# Patient Record
Sex: MALE | Employment: FULL TIME | URBAN - METROPOLITAN AREA
[De-identification: names, ages, dates, MRNs, and addresses within clinical notes are randomized per-mention and may not be internally consistent; named-entity substitution may affect disease eponyms.]

---

## 2021-05-20 ENCOUNTER — HOSPITAL ENCOUNTER (OUTPATIENT)
Dept: NON INVASIVE DIAGNOSTICS | Age: 56
Discharge: HOME | End: 2021-05-20
Payer: COMMERCIAL

## 2021-05-20 DIAGNOSIS — R03.0 ELEVATED BLOOD PRESSURE READING WITHOUT DIAGNOSIS OF HYPERTENSION: ICD-10-CM

## 2021-05-20 DIAGNOSIS — I45.10 RBBB: ICD-10-CM

## 2021-05-20 DIAGNOSIS — I45.10 RIGHT BUNDLE BRANCH BLOCK: ICD-10-CM

## 2021-05-20 PROBLEM — D17.24 LIPOMA OF LEFT LOWER EXTREMITY: Status: ACTIVE | Noted: 2021-05-20

## 2021-05-20 PROBLEM — D17.20 LIPOMA OF HAND: Status: ACTIVE | Noted: 2021-05-20

## 2021-05-20 PROBLEM — D17.21 LIPOMA OF RIGHT UPPER EXTREMITY: Status: ACTIVE | Noted: 2021-05-20

## 2021-05-20 PROBLEM — D17.22 LIPOMA OF LEFT UPPER EXTREMITY: Status: ACTIVE | Noted: 2021-05-20

## 2021-05-20 LAB
LV EF: 58 %
LVEF MODALITY: NORMAL

## 2021-05-20 PROCEDURE — 93306 TTE W/DOPPLER COMPLETE: CPT

## 2021-09-14 ENCOUNTER — INITIAL CONSULT (OUTPATIENT)
Dept: CARDIOLOGY CLINIC | Age: 56
End: 2021-09-14
Payer: COMMERCIAL

## 2021-09-14 VITALS
HEART RATE: 61 BPM | BODY MASS INDEX: 30.33 KG/M2 | SYSTOLIC BLOOD PRESSURE: 142 MMHG | HEIGHT: 69 IN | WEIGHT: 204.8 LBS | DIASTOLIC BLOOD PRESSURE: 88 MMHG

## 2021-09-14 DIAGNOSIS — R07.9 CHEST PAIN, UNSPECIFIED TYPE: Primary | ICD-10-CM

## 2021-09-14 PROCEDURE — 99204 OFFICE O/P NEW MOD 45 MIN: CPT | Performed by: INTERNAL MEDICINE

## 2021-09-14 PROCEDURE — 93000 ELECTROCARDIOGRAM COMPLETE: CPT | Performed by: INTERNAL MEDICINE

## 2021-09-14 NOTE — PATIENT INSTRUCTIONS
Please be informed that if you contact our office outside of normal business hours the physician on call cannot help with any scheduling or rescheduling issues, procedure instruction questions or any type of medication issue. We advise you for any urgent/emergency that you go to the nearest emergency room! PLEASE CALL OUR OFFICE DURING NORMAL BUSINESS HOURS    Monday - Friday   8 am to 5 pm    [place]: [place]: [phone]    [place]:  [phone]    **It is YOUR responsibilty to bring medication bottles and/or updated medication list to [address] [place] [address].  This will allow us to better serve you and all your healthcare needs**

## 2021-09-14 NOTE — LETTER
these providers! Do you have a surgery or procedure scheduled in the near future -  NONE   If Yes- DO EKG  If Yes - Who is the surgery with?    Phone number of physician   Fax number of physician   Type of surgery   GIVE THIS INFORMATION TO KAYLAN SHIELDS     Ask patient if they want to sign up for MyChart if they are not already signed up     Check to see if we have an E-MAIL on file for the patient     Check medication list thoroughly!!! AND RECONCILE OUTSIDE MEDICATIONS  If dose has changed change the entire order not just the [place] At check out add to every patient's \"wrap up\" the following dot phrase AFTERHOURSEDUCATION and ensure we explain this to our patients

## 2021-09-14 NOTE — PROGRESS NOTES
Minnette Saint  is a  New patient  ,55 y.o.   male here for evaluation of the following chief complaint(s):    Abn ekg        SUBJECTIVE/OBJECTIVE:  HPI : h/o  rbb now here  Has been having SOB, has no CP for  [facility]    Review of Systems    SOB    Vitals:    09/14/21 1520   BP: (!) 142/88   Pulse: 61   Weight: 204 lb 12.8 oz (92.9 kg)   Height: 5' 9\" (1.753 m)     BP (!) 142/88   Pulse 61   Ht 5' 9\" (1.753 m)   Wt 204 lb 12.8 oz (92.9 kg)   BMI 30.24 kg/m²   No flowsheet data found. Wt Readings from Last 3 Encounters:   09/14/21 204 lb 12.8 oz (92.9 kg)   05/26/21 200 lb (90.7 kg)   05/04/21 200 lb (90.7 kg)     Body mass index is 30.24 kg/m². Physical Exam     Neck: JVD  no    Lungs : clear    Cardio : Si and S2 audilble      Ext: edema  no    All pertinent data reviewed  y    Meds : reviewed   y      Tests ordered    y    ASSESSMENT/PLAN:    - RBBB:  ETT    - RV dilated, echo reviewed  CT chest      Conclusions      Summary   Left ventricular systolic function is normal.   Ejection fraction is visually estimated at 55-60%. Severely dilated right ventricle measuring 4.28 cm. No evidence of right   heart strain. No significant valvular disease noted. No evidence of any pericardial effusion. Signature      ------------------------------------------------------------------   Electronically signed by Андрей Nunez MD (Interpreting   physician) on 05/20/2021 at 12:39 PM    An electronic signature was used to authenticate this note.     --Kan Vogel MD

## 2021-09-15 ENCOUNTER — TELEPHONE (OUTPATIENT)
Dept: CARDIOLOGY CLINIC | Age: 56
End: 2021-09-15

## 2021-09-20 ENCOUNTER — HOSPITAL ENCOUNTER (OUTPATIENT)
Dept: CT IMAGING | Age: 56
Discharge: HOME | End: 2021-09-20
Payer: COMMERCIAL

## 2021-09-20 DIAGNOSIS — R07.9 CHEST PAIN, UNSPECIFIED TYPE: ICD-10-CM

## 2021-09-20 PROCEDURE — 6360000004 HC RX CONTRAST MEDICATION: Performed by: INTERNAL MEDICINE

## 2021-09-20 PROCEDURE — 2580000003 HC RX 258: Performed by: INTERNAL MEDICINE

## 2021-09-20 PROCEDURE — 71275 CT ANGIOGRAPHY CHEST: CPT

## 2021-09-20 RX ORDER — SODIUM CHLORIDE 0.9 % (FLUSH) 0.9 %
10 SYRINGE (ML) INJECTION PRN
Status: DISCONTINUED | OUTPATIENT
Start: 2021-09-20 | End: 2021-09-21 | Stop reason: HOSPADM

## 2021-09-20 RX ADMIN — SODIUM CHLORIDE, PRESERVATIVE FREE 10 ML: 5 INJECTION INTRAVENOUS at 13:14

## 2021-09-20 RX ADMIN — IOPAMIDOL 75 ML: 755 INJECTION, SOLUTION INTRAVENOUS at 13:14

## 2021-09-22 ENCOUNTER — PROCEDURE VISIT (OUTPATIENT)
Dept: CARDIOLOGY CLINIC | Age: 56
End: 2021-09-22

## 2021-09-22 DIAGNOSIS — R07.9 CHEST PAIN, UNSPECIFIED TYPE: ICD-10-CM

## 2021-10-13 PROBLEM — I45.10 RIGHT BUNDLE BRANCH BLOCK: Status: ACTIVE | Noted: 2021-10-13

## 2021-10-14 ENCOUNTER — OFFICE VISIT (OUTPATIENT)
Dept: CARDIOLOGY CLINIC | Age: 56
End: 2021-10-14
Payer: COMMERCIAL

## 2021-10-14 ENCOUNTER — PROCEDURE VISIT (OUTPATIENT)
Dept: CARDIOLOGY CLINIC | Age: 56
End: 2021-10-14
Payer: COMMERCIAL

## 2021-10-14 VITALS
SYSTOLIC BLOOD PRESSURE: 128 MMHG | HEART RATE: 64 BPM | WEIGHT: 203 LBS | DIASTOLIC BLOOD PRESSURE: 88 MMHG | BODY MASS INDEX: 30.07 KG/M2 | HEIGHT: 69 IN

## 2021-10-14 DIAGNOSIS — I45.10 RIGHT BUNDLE BRANCH BLOCK: ICD-10-CM

## 2021-10-14 DIAGNOSIS — I51.7 ENLARGED RV (RIGHT VENTRICLE): Primary | ICD-10-CM

## 2021-10-14 PROCEDURE — 93308 TTE F-UP OR LMTD: CPT | Performed by: INTERNAL MEDICINE

## 2021-10-14 PROCEDURE — 99213 OFFICE O/P EST LOW 20 MIN: CPT | Performed by: NURSE PRACTITIONER

## 2021-10-14 ASSESSMENT — ENCOUNTER SYMPTOMS
ORTHOPNEA: 0
SHORTNESS OF BREATH: 0

## 2021-10-14 NOTE — PATIENT INSTRUCTIONS
**It is YOUR responsibilty to bring medication bottles and/or updated medication list to [address] [place] [address]. This will allow us to better serve you and all your healthcare needs**  Please be informed that if you contact our office outside of normal business hours the physician on call cannot help with any scheduling or rescheduling issues, procedure instruction questions or any type of medication issue. We advise you for any urgent/emergency that you go to the nearest emergency room!     PLEASE CALL OUR OFFICE DURING NORMAL BUSINESS HOURS    Monday - Friday   8 am to 5 pm    [place]: [phone]    [place]:  [phone]

## 2021-10-14 NOTE — LETTER
Rayann Papas Dr. Jettie Bigness D Isadore Homans  1965  [id number]    Have you had any Chest Pain that is not new? - No       DO EKG IF: Patient has a Heart Rate above 100 or below 40     CAD (Coronary Artery Disease) patient should have one on file every 6 months      Have you had any Shortness of Breath - No      Have you had any dizziness - No       Sitting wait 5 minutes do supine (laying down) wait 5 minutes then do standing - log each in \"vitals\" area in Epic   Be sure to ask what symptoms they are having if they get dizzy while completing ortho stats such as: room spinning, nausea, etc.    Have you had any palpitations that are not new? - No    Is the patient on any of the following medications - No  If Yes DO EKG - Needs done every 6 months    Do you have any edema - swelling in No        Vein \"LEG PROBLEM Questionnaire\"  1. Do you have prominent leg veins? No   2. Do you have any skin discoloration? No  3. Do you have any healed or active sores? No  4. Do you have swelling of the legs? No  5. Do you have a family history of varicose veins? No  6. Does your profession involve pro-longed        standing or heavy lifting? Yes  7. Have you been fighting overweight problems? No  8. Do you have restless legs? No  9. Do you have any night time cramps? No  10. Do you have any of the following in your legs: No             When did you have your last labs drawn 05/24/2021  Where did you have them done   What doctor ordered Katy Iglesias    If we do not have these labs you are retrieve these labs for these providers!     Do you have a surgery or procedure scheduled in the near future - No       Ask patient if they want to sign up for [employer] if they are not already signed up     Check to see if we have an E-MAIL on file for the patient     Check medication list thoroughly!!! AND RECONCILE OUTSIDE MEDICATIONS  If dose has changed change the entire order not just the MG  BE SURE TO ASK PATIENT IF THEY NEED MEDICATION REFILLS     At check out add to every patient's \"wrap up\" the following dot phrase AFTERHOURSEDUCATION and ensure we explain this to our patients

## 2021-10-14 NOTE — PROGRESS NOTES
10/14/2021  Primary cardiologist: Dr. Mixon Tommie  is an established 55 y.o.  male here for follow-up on   1. Enlarged RV (right ventricle)    2. Right bundle branch block            SUBJECTIVE/OBJECTIVE:    HPI : Emil Diaz is a 50-year-old gentleman with a history of right bundle branch block and abnormal EKG. He was initially seen as a consult in September 2021 for abnormal EKG and chest pain. He underwent noninvasive testing with exercise treadmill in which she exercised according to Christiano protocol for 11 minutes achieving a work level of 13.4 METS. EKG showed no evidence of ischemia. Echocardiogram demonstrated normal left ventricular systolic function with ejection fraction estimated 35 to 60%. He is noted to have a severely dilated RV measuring 4.12 cm with no evidence of right heart strain. There is no significant valvular heart disease appreciated. CTA of chest showed no evidence of pulmonary emboli or acute pulmonary abnormality. [place] reports feeling well. Denies chest pain or shortness of breath. Denies palpitations, lightheadedness or dizziness. Noted couple years ago he was working heavily he would get lightheaded and nauseous at the end of the exercise routine however is backed off on exercise as heart symptoms have resolved. Notes he drinks alcohol on a routine basis. Review of Systems   Constitutional: Negative for diaphoresis and malaise/fatigue. Cardiovascular: Negative for chest pain, claudication, dyspnea on exertion, irregular heartbeat, leg swelling, near-syncope, orthopnea, palpitations and paroxysmal nocturnal dyspnea. Respiratory: Negative for shortness of breath. Neurological: Negative for dizziness and light-headedness. Vitals:    10/14/21 0824   BP: 128/88   Pulse: 64   Weight: 203 lb (92.1 kg)   Height: 5' 9\" (1.753 m)     No flowsheet data found.   Wt Readings from Last 3 Encounters:   10/14/21 203 lb (92.1 kg)   09/14/21 204 lb 12.8 oz (92.9 kg) on medications       Tests ordered:  eliminated echo to reassess  RV size      Follow-up  6 months     Signed:  ANNALEE Murphy CNP, 10/14/2021, 8:29 AM    An electronic signature was used to authenticate this note.

## 2021-10-19 ENCOUNTER — TELEPHONE (OUTPATIENT)
Dept: CARDIOLOGY CLINIC | Age: 56
End: 2021-10-19

## 2021-10-19 NOTE — TELEPHONE ENCOUNTER
Spoke with pt about results.  He didn't have any questions.        ----- Message from Maureen Roberts sent at 10/18/2021 10:28 AM EDT -----    ----- Message -----  From: ANNALEE Schofield - [place]  Sent: 10/15/2021   4:04 PM EDT  To: Hari Guzman MA    Please phone results - RV is dilated - about the same - continue to follow as scheduled

## 2022-02-15 ENCOUNTER — TELEPHONE (OUTPATIENT)
Dept: CARDIOLOGY CLINIC | Age: 57
End: 2022-02-15

## 2022-02-15 NOTE — TELEPHONE ENCOUNTER
Wife called asking for a copy  of last ekg  From 9/21 needs to turn into there Insurance  Please call when ready

## 2022-02-16 NOTE — TELEPHONE ENCOUNTER
Called and spoke with Jadon ness about med. rec release. [employer] form allows us to discuss pts health, testing, appts and billing with those listed. Not for signing paperwork for the patient. POA would allow those listed to sign for med.rec. I will be emailing the [facility]. release to pt for him to sign and the wife can bring it in to get the EKG only. pt ok with this.

## 2022-04-19 ENCOUNTER — OFFICE VISIT (OUTPATIENT)
Dept: CARDIOLOGY CLINIC | Age: 57
End: 2022-04-19
Payer: COMMERCIAL

## 2022-04-19 VITALS
DIASTOLIC BLOOD PRESSURE: 80 MMHG | SYSTOLIC BLOOD PRESSURE: 128 MMHG | WEIGHT: 198 LBS | BODY MASS INDEX: 29.33 KG/M2 | HEART RATE: 52 BPM | HEIGHT: 69 IN

## 2022-04-19 DIAGNOSIS — I51.7 RIGHT VENTRICULAR DILATION: Primary | ICD-10-CM

## 2022-04-19 PROCEDURE — 99214 OFFICE O/P EST MOD 30 MIN: CPT | Performed by: INTERNAL MEDICINE

## 2022-04-19 NOTE — PROGRESS NOTES
CARDIOLOGY NOTE      4/19/2022    RE: Liz Banegas  (1965)                               TO:  Dr. Robert Harden MD            CHIEF Martínez Ellis is a 65 y.o. male who was seen today for management of right bundle branch block                                    HPI:                   Pt has h/o right bundle branch block, dilated right ventricle, seen today for follow-up. Pt has no cardiac complaints    Liz Banegas has the following history recorded in care path:  Patient Active Problem List    Diagnosis Date Noted    Enlarged RV (right ventricle) 10/14/2021    Right bundle branch block 10/13/2021    Lipoma of left upper extremity 05/20/2021    Lipoma of left lower extremity 05/20/2021    Lipoma of right upper extremity 05/20/2021    Lipoma of hand 05/20/2021    Overweight (BMI 25.0-29.9) 09/17/2015    Alcohol abuse 09/17/2015    Allergic rhinitis due to pollen 09/17/2015    Testicular pain, left 09/17/2015     No current outpatient medications on file. No current facility-administered medications for this visit. Allergies: Patient has no known allergies. History reviewed. No pertinent past medical history. Past Surgical History:   Procedure Laterality Date    LIPOMA RESECTION      VASECTOMY        As reviewed   Family History   Problem Relation Age of Onset    Diabetes Mother     High Blood Pressure Mother     Cancer Paternal Grandfather      Social History     Tobacco Use    Smoking status: Never Smoker    Smokeless tobacco: Never Used   Substance Use Topics    Alcohol use: Yes     Alcohol/week: 35.0 standard drinks     Types: 35 Shots of liquor per week        Objective:    Vitals:    04/19/22 1002   BP: 128/80   Pulse: 52   Weight: 198 lb (89.8 kg)   Height: 5' 9\" (1.753 m)     /80   Pulse 52   Ht 5' 9\" (1.753 m)   Wt 198 lb (89.8 kg)   BMI 29.24 kg/m²     No flowsheet data found.      Wt Readings from Last 3 Encounters:   04/19/22 198 lb (89.8 kg)   10/14/21 203 lb (92.1 kg)   09/14/21 204 lb 12.8 oz (92.9 kg)     Body mass index is 29.24 kg/m². GENERAL - Alert, oriented, pleasant, in no apparent distress. EYES: No jaundice, no conjunctival pallor. SKIN: It is warm & dry. No rashes. No Echhymosis    HEENT  No clinically significant abnormalities seen. Neck - Supple. No jugular venous distention noted. No carotid bruits. Cardiovascular  Normal S1 and S2 without obvious murmur or gallop. Extremities - No cyanosis, clubbing, or significant edema. Pulmonary  No respiratory distress. No wheezes or rales. Abdomen  No masses, tenderness, or organomegaly. Musculoskeletal  No significant edema. No joint deformities. No muscle wasting. Neurologic  Cranial nerves II through XII are grossly intact. There were no gross focal neurologic abnormalities.     Lab Review   No results found for: CKTOTAL, CKMB, CKMBINDEX, TROPONINT  BNP:  No results found for: BNP  PT/INR:  No results found for: INR  Lab Results   Component Value Date    LABA1C 4.8 09/17/2015     Lab Results   Component Value Date    WBC 4.3 09/17/2015    HCT 43.4 09/17/2015    MCV 90.8 09/17/2015     09/17/2015     Lab Results   Component Value Date    CHOL 161 09/17/2015    TRIG 101 09/17/2015    HDL 39 (L) 09/17/2015    LDLCALC 102 (H) 09/17/2015     Lab Results   Component Value Date    ALT 30 09/17/2015    AST 25 09/17/2015     BMP:    Lab Results   Component Value Date     09/17/2015    K 4.2 09/17/2015     09/17/2015    CO2 27 09/17/2015    BUN 15 09/17/2015    CREATININE 1.2 09/17/2015     CMP:   Lab Results   Component Value Date     09/17/2015    K 4.2 09/17/2015     09/17/2015    CO2 27 09/17/2015    BUN 15 09/17/2015    PROT 7.0 09/17/2015     TSH:  No results found for: TSH, TSHHS        Assessment & Plan:    -Right bundle branch block which is a benign finding and him    -Dilated right ventricle work-up has been negative there is no PE on the CT scan and no other abnormality found,no  family history might need a cardiac MRI if needed      -Advised to discontinue alcohol on last visit as well    -Had CAD work-up including stress test which was unremarkable    - echo rev with pt  Limited echo for RV  If dilated still get cardiac MRI to r/o ARVD    Summary   Limited echo to assess right side. Normal left ventricle structure and systolic function with an ejection   fraction of 55-60%. RV is difficult to visualize in apical views. Moderately dilated right ventricle. No evidence of strain or systolic   dysfunction. Normal pulmonary artery pressure, 29 mmHg. Signature      ------------------------------------------------------------------   Electronically signed by Darylene Brodie MD   (Interpreting physician) on 10/15/2021 at 03:57 PM  Feliciano Ch MD    [address] [facility]    Please note this report has been partially produced using speech recognition software and may contain errors related to that system including errors in grammar, punctuation, and spelling, as well as words and phrases that may be inappropriate. If there are any questions or concerns please feel free to contact the dictating provider for clarification.

## 2022-04-19 NOTE — PATIENT INSTRUCTIONS
**It is YOUR responsibilty to bring medication bottles and/or updated medication list to [address] [place] [address]. This will allow us to better serve you and all your healthcare needs**    Please be informed that if you contact our office outside of normal business hours the physician on call cannot help with any scheduling or rescheduling issues, procedure instruction questions or any type of medication issue. We advise you for any urgent/emergency that you go to the nearest emergency room!     PLEASE CALL OUR OFFICE DURING NORMAL BUSINESS HOURS    Monday - Friday   8 am to 5 pm    [place]: [place] 12: [phone]    [place]:  [phone]
Diabetes mellitus

## 2022-04-26 ENCOUNTER — PROCEDURE VISIT (OUTPATIENT)
Dept: CARDIOLOGY CLINIC | Age: 57
End: 2022-04-26
Payer: COMMERCIAL

## 2022-04-26 DIAGNOSIS — I45.10 RIGHT BUNDLE BRANCH BLOCK: ICD-10-CM

## 2022-04-26 DIAGNOSIS — I51.7 RIGHT VENTRICULAR DILATION: ICD-10-CM

## 2022-04-26 PROCEDURE — 93308 TTE F-UP OR LMTD: CPT | Performed by: INTERNAL MEDICINE

## 2022-04-27 ENCOUNTER — TELEPHONE (OUTPATIENT)
Dept: CARDIOLOGY CLINIC | Age: 57
End: 2022-04-27

## 2022-04-27 NOTE — TELEPHONE ENCOUNTER
Left ventricular function is normal, EF is estimated at 55-60%. Mildly enlarged right ventricle cavity. Mild tricuspid regurgitation RVSP 23mmhg. Dilation of the aortic root(4.1cm) and the ascending aorta(4.2cm). No evidence of any pericardial effusion. Results given to the patient, Patient verbally understood results.

## 2022-12-13 ENCOUNTER — OFFICE VISIT (OUTPATIENT)
Dept: CARDIOLOGY CLINIC | Age: 57
End: 2022-12-13
Payer: COMMERCIAL

## 2022-12-13 VITALS
WEIGHT: 201 LBS | DIASTOLIC BLOOD PRESSURE: 80 MMHG | BODY MASS INDEX: 29.77 KG/M2 | HEIGHT: 69 IN | HEART RATE: 64 BPM | SYSTOLIC BLOOD PRESSURE: 130 MMHG

## 2022-12-13 DIAGNOSIS — I51.7 RIGHT VENTRICULAR DILATION: Primary | ICD-10-CM

## 2022-12-13 PROCEDURE — 99214 OFFICE O/P EST MOD 30 MIN: CPT | Performed by: INTERNAL MEDICINE

## 2022-12-13 NOTE — PATIENT INSTRUCTIONS
Please be informed that if you contact our office outside of normal business hours the physician on call cannot help with any scheduling or rescheduling issues, procedure instruction questions or any type of medication issue. We advise you for any urgent/emergency that you go to the nearest emergency room! PLEASE CALL OUR OFFICE DURING NORMAL BUSINESS HOURS    Monday - Friday   8 am to 5 pm    [place]: [phone]    [place]:  [phone]  **It is YOUR responsibilty to bring medication bottles and/or updated medication list to [address] [place] [address]. This will allow us to better serve you and all your healthcare needs**  [facility] Locations - No appointment necessary. Sites open Monday to Friday. Call your preferred location for test preparation, business hours and other information you need. [employer] accepts [employer]. [facility]   [facility] [address] [place], [address] [facility]  Phone: [phone] [facility] [address] [facility]  [address] [place], [address]  Phone: [phone]     . ere

## 2022-12-13 NOTE — PROGRESS NOTES
CARDIOLOGY NOTE      12/13/2022    RE: Beronica Eaton  (1965)                               TO:  Dr. Edward Barnes MD            CHIEF Oanh Garcia is a 65 y.o. male who was seen today for management of right bundle branch block                                    HPI:                   Pt has h/o right bundle branch block, dilated right ventricle, seen today for follow-up. Pt has no cardiac complaints    Beronica Eaton has the following history recorded in care path:  Patient Active Problem List    Diagnosis Date Noted    Enlarged RV (right ventricle) 10/14/2021    Right bundle branch block 10/13/2021    Lipoma of left upper extremity 05/20/2021    Lipoma of left lower extremity 05/20/2021    Lipoma of right upper extremity 05/20/2021    Lipoma of hand 05/20/2021    Overweight (BMI 25.0-29.9) 09/17/2015    Alcohol abuse 09/17/2015    Allergic rhinitis due to pollen 09/17/2015    Testicular pain, left 09/17/2015     No current outpatient medications on file. No current facility-administered medications for this visit. Allergies: Patient has no known allergies. No past medical history on file. Past Surgical History:   Procedure Laterality Date    LIPOMA RESECTION      VASECTOMY        As reviewed   Family History   Problem Relation Age of Onset    Diabetes Mother     High Blood Pressure Mother     Cancer Paternal Grandfather      Social History     Tobacco Use    Smoking status: Never    Smokeless tobacco: Never   Substance Use Topics    Alcohol use: Yes     Alcohol/week: 12.0 standard drinks     Types: 12 Shots of liquor per week     Comment: caffeine 7 energy drinks per week.         Objective:    Vitals:    12/13/22 1533   BP: 130/80   Site: Left Upper Arm   Position: Sitting   Cuff Size: Medium Adult   Pulse: 64   Weight: 201 lb (91.2 kg)   Height: 5' 9\" (1.753 m)     /80 (Site: Left Upper Arm, Position: Sitting, Cuff Size: Medium Adult)   Pulse 64   Ht 5' 9\" (1.753 m) Wt 201 lb (91.2 kg)   BMI 29.68 kg/m²     No flowsheet data found. Wt Readings from Last 3 Encounters:   12/13/22 201 lb (91.2 kg)   04/19/22 198 lb (89.8 kg)   10/14/21 203 lb (92.1 kg)     Body mass index is 29.68 kg/m². GENERAL - Alert, oriented, pleasant, in no apparent distress. EYES: No jaundice, no conjunctival pallor. SKIN: It is warm & dry. No rashes. No Echhymosis    HEENT - No clinically significant abnormalities seen. Neck - Supple. No jugular venous distention noted. No carotid bruits. Cardiovascular - Normal S1 and S2 without obvious murmur or gallop. Extremities - No cyanosis, clubbing, or significant edema. Pulmonary - No respiratory distress. No wheezes or rales. Abdomen - No masses, tenderness, or organomegaly. Musculoskeletal - No significant edema. No joint deformities. No muscle wasting. Neurologic - Cranial nerves II through XII are grossly intact. There were no gross focal neurologic abnormalities.     Lab Review   No results found for: CKTOTAL, CKMB, CKMBINDEX, TROPONINT  BNP:  No results found for: BNP  PT/INR:  No results found for: INR  Lab Results   Component Value Date    LABA1C 4.8 09/17/2015     Lab Results   Component Value Date    WBC 4.3 09/17/2015    HCT 43.4 09/17/2015    MCV 90.8 09/17/2015     09/17/2015     Lab Results   Component Value Date    CHOL 161 09/17/2015    TRIG 101 09/17/2015    HDL 39 (L) 09/17/2015    LDLCALC 102 (H) 09/17/2015     Lab Results   Component Value Date    ALT 30 09/17/2015    AST 25 09/17/2015     BMP:    Lab Results   Component Value Date/Time     09/17/2015 03:24 PM    K 4.2 09/17/2015 03:24 PM     09/17/2015 03:24 PM    CO2 27 09/17/2015 03:24 PM    BUN 15 09/17/2015 03:24 PM    CREATININE 1.2 09/17/2015 03:24 PM     CMP:   Lab Results   Component Value Date/Time     09/17/2015 03:24 PM    K 4.2 09/17/2015 03:24 PM     09/17/2015 03:24 PM    CO2 27 09/17/2015 03:24 PM    BUN 15 09/17/2015 03:24 PM    PROT 7.0 09/17/2015 03:24 PM     TSH:  No results found for: TSH, TSHHS        Assessment & Plan:    -Right bundle branch block which is a benign finding and him    -Dilated right ventricle work-up has been negative there is no PE on the CT scan and no other abnormality found,no  family history might need a cardiac MRI if needed      -Advised to discontinue alcohol on last visit as well    -Had CAD work-up including stress test which was unremarkable    - echo rev with pt  Need yearly echo      Summary   Left ventricular function is normal, EF is estimated at 55-60%. Mildly enlarged right ventricle cavity. Mild tricuspid regurgitation RVSP 23mmhg. Dilation of the aortic root(4.1cm) and the ascending aorta(4.2cm). No evidence of any pericardial effusion. Signature      ------------------------------------------------------------------   Electronically signed by Arabella Rojas MD (Interpreting   physician) on 04/26/2022 at 09:16 AM   ------------------------------------------------------------------  Gaurang Youssef MD    [facility] [place]    Please note this report has been partially produced using speech recognition software and may contain errors related to that system including errors in grammar, punctuation, and spelling, as well as words and phrases that may be inappropriate. If there are any questions or concerns please feel free to contact the dictating provider for clarification.

## 2023-08-17 ENCOUNTER — OFFICE VISIT (OUTPATIENT)
Dept: CARDIOLOGY CLINIC | Age: 58
End: 2023-08-17
Payer: COMMERCIAL

## 2023-08-17 VITALS
SYSTOLIC BLOOD PRESSURE: 130 MMHG | DIASTOLIC BLOOD PRESSURE: 84 MMHG | WEIGHT: 201.4 LBS | HEIGHT: 69 IN | HEART RATE: 53 BPM | BODY MASS INDEX: 29.83 KG/M2

## 2023-08-17 DIAGNOSIS — I77.810 DILATED AORTIC ROOT (HCC): ICD-10-CM

## 2023-08-17 DIAGNOSIS — I45.10 RIGHT BUNDLE BRANCH BLOCK: Primary | ICD-10-CM

## 2023-08-17 PROCEDURE — 93000 ELECTROCARDIOGRAM COMPLETE: CPT | Performed by: NURSE PRACTITIONER

## 2023-08-17 PROCEDURE — 99213 OFFICE O/P EST LOW 20 MIN: CPT | Performed by: NURSE PRACTITIONER

## 2023-08-17 ASSESSMENT — ENCOUNTER SYMPTOMS
SHORTNESS OF BREATH: 0
ORTHOPNEA: 0

## 2023-09-28 ENCOUNTER — PROCEDURE VISIT (OUTPATIENT)
Dept: CARDIOLOGY CLINIC | Age: 58
End: 2023-09-28
Payer: COMMERCIAL

## 2023-09-28 DIAGNOSIS — I77.810 DILATED AORTIC ROOT (HCC): ICD-10-CM

## 2023-09-28 PROCEDURE — 93306 TTE W/DOPPLER COMPLETE: CPT | Performed by: INTERNAL MEDICINE

## 2023-10-02 ENCOUNTER — TELEPHONE (OUTPATIENT)
Dept: CARDIOLOGY CLINIC | Age: 58
End: 2023-10-02

## 2023-10-02 NOTE — TELEPHONE ENCOUNTER
Summary   Left ventricular function and size is normal, EF is estimated at 55-60%. Mild left ventricular hypertrophy. Grade I diastolic dysfunction. No regional wall motion abnormalities were detected. No significant valvular abnormalities. Mild tricuspid regurgitation; RVSP is 22 mmHg. Dilation of the aortic root(3.8cm) and the ascending aorta. (4.0cm)   No evidence of pericardial effusion.    reecho in one year aortic dilatation

## 2024-09-26 ENCOUNTER — OFFICE VISIT (OUTPATIENT)
Dept: CARDIOLOGY CLINIC | Age: 59
End: 2024-09-26
Payer: COMMERCIAL

## 2024-09-26 VITALS
HEART RATE: 65 BPM | BODY MASS INDEX: 30.66 KG/M2 | HEIGHT: 69 IN | SYSTOLIC BLOOD PRESSURE: 144 MMHG | WEIGHT: 207 LBS | DIASTOLIC BLOOD PRESSURE: 88 MMHG

## 2024-09-26 DIAGNOSIS — I45.10 RIGHT BUNDLE BRANCH BLOCK: Primary | ICD-10-CM

## 2024-09-26 PROCEDURE — 99214 OFFICE O/P EST MOD 30 MIN: CPT | Performed by: INTERNAL MEDICINE

## 2024-09-26 PROCEDURE — 93000 ELECTROCARDIOGRAM COMPLETE: CPT | Performed by: INTERNAL MEDICINE
